# Patient Record
Sex: MALE | Race: WHITE | Employment: STUDENT | URBAN - METROPOLITAN AREA
[De-identification: names, ages, dates, MRNs, and addresses within clinical notes are randomized per-mention and may not be internally consistent; named-entity substitution may affect disease eponyms.]

---
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
## 2022-01-13 NOTE — ED INITIAL ASSESSMENT (HPI)
States abd pain x 3-4 days. States bowel movements are normal. Saw some blood in his urine yesterday. Points to left lower quadrant for area of pain. States pain does not keep him up at night. Some nausea today. No vomiting.
Standing/Walking/Toileting

## (undated) NOTE — ED AVS SNAPSHOT
Verónica Olvera   MRN: [id number]    Department:  [facility] Emergency Department   Date of Visit:  7/29/2018           Disclosure     Insurance plans vary and the physician(s) referred by the ER may not be covered by your plan.  Please contact your i tell this physician (or your personal doctor if your instructions are to return to your personal doctor) about any new or lasting problems. The primary care or specialist physician will see patients referred from the [facility] Emergency Department.  Silvano Damico

## (undated) NOTE — MR AVS SNAPSHOT
[address] [facility]  [address] [facility] [address] [phone]               Thank you for choosing us for your health care visit with Isabel Young PA-C.   We are glad to serve you and happy to provide you with this summary of your visit Sign up for Visitar access for your child. Visitar access allows you to view health information for your child from their recent   visit, view other health information and more.   To sign up or find more information on getting   Proxy Access to your child In addition to 5, 4, 3, 2, 1 families can make small changes in their family routines to help everyone lead healthier active lives.  Try:  o Eating breakfast everyday  o Eating low-fat dairy products like yogurt, milk, and cheese  o Regularly eating meals t

## (undated) NOTE — ED AVS SNAPSHOT
THE [facility] in [facility] [address] [address]    Phone:  [phone]    Fax:  [phone]   MRN: [id number]    Department:  THE [facility] in [place] [facility]   Date of Visit:  1/25/2017           Diagnos may not be covered by your plan. Please contact your insurance company to determine coverage for follow-up care and referrals. [facility]  [address] [facility], [address] [place] [address]  [phone] [address]  [address] prescription right away and begin taking the medication(s) as directed. If the Immediate Care Provider has read X-rays, these will be re-interpreted by a radiologist.  If there is a significant change in your reading, you will be contacted.  Please make Medicaid plans. To get signed up and covered, please call [phone] and ask to get set up for an insurance coverage that is in-network with [place] [id number].         Imaging Results         XR ABDOMEN (KUB) (1 AP VIEW)  (CPT=74000) (Final result